# Patient Record
Sex: MALE | Race: WHITE | NOT HISPANIC OR LATINO
[De-identification: names, ages, dates, MRNs, and addresses within clinical notes are randomized per-mention and may not be internally consistent; named-entity substitution may affect disease eponyms.]

---

## 2023-02-09 ENCOUNTER — APPOINTMENT (OUTPATIENT)
Dept: FAMILY MEDICINE | Facility: CLINIC | Age: 38
End: 2023-02-09
Payer: OTHER GOVERNMENT

## 2023-02-09 VITALS
SYSTOLIC BLOOD PRESSURE: 124 MMHG | OXYGEN SATURATION: 97 % | DIASTOLIC BLOOD PRESSURE: 80 MMHG | RESPIRATION RATE: 16 BRPM | HEART RATE: 92 BPM | WEIGHT: 241 LBS | BODY MASS INDEX: 29.97 KG/M2 | HEIGHT: 75 IN | TEMPERATURE: 97.2 F

## 2023-02-09 DIAGNOSIS — Z13.29 ENCOUNTER FOR SCREENING FOR OTHER SUSPECTED ENDOCRINE DISORDER: ICD-10-CM

## 2023-02-09 DIAGNOSIS — Z13.228 ENCOUNTER FOR SCREENING FOR OTHER SUSPECTED ENDOCRINE DISORDER: ICD-10-CM

## 2023-02-09 DIAGNOSIS — Z13.0 ENCOUNTER FOR SCREENING FOR OTHER SUSPECTED ENDOCRINE DISORDER: ICD-10-CM

## 2023-02-09 DIAGNOSIS — E78.5 HYPERLIPIDEMIA, UNSPECIFIED: ICD-10-CM

## 2023-02-09 DIAGNOSIS — Z00.00 ENCOUNTER FOR GENERAL ADULT MEDICAL EXAMINATION W/OUT ABNORMAL FINDINGS: ICD-10-CM

## 2023-02-09 PROCEDURE — 99385 PREV VISIT NEW AGE 18-39: CPT

## 2023-02-09 NOTE — HISTORY OF PRESENT ILLNESS
[de-identified] : PT presenting for CPE\par came off [employer] duty in Sept 2022\par Is currently doing well\par \par Crestor- 20 mg doing well\par has been on it for about a year\par \par No surgeries \par Would like referral for vasectomy \par hx of hydroceles, mainly on the left. gets US done every 5 years to check for any concerns \par hx Of HSV -2\par \par Mother had hx of of abnormal colonoscopy\par last colonoscopy was 2022

## 2023-02-09 NOTE — HEALTH RISK ASSESSMENT
[Good] : ~his/her~  mood as  good [Yes] : Yes [Monthly or less (1 pt)] : Monthly or less (1 point) [No] : In the past 12 months have you used drugs other than those required for medical reasons? No [No falls in past year] : Patient reported no falls in the past year [0] : 2) Feeling down, depressed, or hopeless: Not at all (0) [PHQ-2 Negative - No further assessment needed] : PHQ-2 Negative - No further assessment needed [Patient reported colonoscopy was normal] : Patient reported colonoscopy was normal [Fully functional (bathing, dressing, toileting, transferring, walking, feeding)] : Fully functional (bathing, dressing, toileting, transferring, walking, feeding) [Fully functional (using the telephone, shopping, preparing meals, housekeeping, doing laundry, using] : Fully functional and needs no help or supervision to perform IADLs (using the telephone, shopping, preparing meals, housekeeping, doing laundry, using transportation, managing medications and managing finances) [Never] : Never [Change in mental status noted] : No change in mental status noted [Language] : denies difficulty with language [Handling Complex Tasks] : denies difficulty handling complex tasks [ColonoscopyDate] : 08/22 [ColonoscopyComments] : return in 5 years

## 2023-02-09 NOTE — ASSESSMENT
[FreeTextEntry1] : Pt presenting for annual physical. \par \par Reviewed diet, exercise (150 min/moderate intensity exercise weekly), lifestyle modifications. \par Discussed STD prevention and discussed screening studies per below:\par \par Colon Cancer-UTD\par Labs ordered per above. \par f.u in 1 year or earlier if needed \par \par \par

## 2023-02-10 LAB
25(OH)D3 SERPL-MCNC: 16.7 NG/ML
ALBUMIN SERPL ELPH-MCNC: 4.8 G/DL
ALP BLD-CCNC: 96 U/L
ALT SERPL-CCNC: 47 U/L
ANION GAP SERPL CALC-SCNC: 17 MMOL/L
AST SERPL-CCNC: 30 U/L
BASOPHILS # BLD AUTO: 0.07 K/UL
BASOPHILS NFR BLD AUTO: 0.9 %
BILIRUB SERPL-MCNC: 0.4 MG/DL
BUN SERPL-MCNC: 14 MG/DL
CALCIUM SERPL-MCNC: 9.6 MG/DL
CHLORIDE SERPL-SCNC: 103 MMOL/L
CHOLEST SERPL-MCNC: 152 MG/DL
CO2 SERPL-SCNC: 22 MMOL/L
CREAT SERPL-MCNC: 0.93 MG/DL
EGFR: 107 ML/MIN/1.73M2
EOSINOPHIL # BLD AUTO: 0.17 K/UL
EOSINOPHIL NFR BLD AUTO: 2.2 %
ESTIMATED AVERAGE GLUCOSE: 114 MG/DL
GLUCOSE SERPL-MCNC: 92 MG/DL
HBA1C MFR BLD HPLC: 5.6 %
HCT VFR BLD CALC: 47.1 %
HDLC SERPL-MCNC: 46 MG/DL
HGB BLD-MCNC: 16.4 G/DL
IMM GRANULOCYTES NFR BLD AUTO: 0.3 %
LDLC SERPL CALC-MCNC: 53 MG/DL
LYMPHOCYTES # BLD AUTO: 1.84 K/UL
LYMPHOCYTES NFR BLD AUTO: 24.1 %
MAN DIFF?: NORMAL
MCHC RBC-ENTMCNC: 30.6 PG
MCHC RBC-ENTMCNC: 34.8 GM/DL
MCV RBC AUTO: 87.9 FL
MONOCYTES # BLD AUTO: 0.69 K/UL
MONOCYTES NFR BLD AUTO: 9.1 %
NEUTROPHILS # BLD AUTO: 4.83 K/UL
NEUTROPHILS NFR BLD AUTO: 63.4 %
NONHDLC SERPL-MCNC: 105 MG/DL
PLATELET # BLD AUTO: 189 K/UL
POTASSIUM SERPL-SCNC: 4.2 MMOL/L
PROT SERPL-MCNC: 7.5 G/DL
PSA SERPL-MCNC: 0.91 NG/ML
RBC # BLD: 5.36 M/UL
RBC # FLD: 13 %
SODIUM SERPL-SCNC: 142 MMOL/L
TRIGL SERPL-MCNC: 263 MG/DL
TSH SERPL-ACNC: 1.28 UIU/ML
WBC # FLD AUTO: 7.62 K/UL

## 2023-03-27 ENCOUNTER — APPOINTMENT (OUTPATIENT)
Dept: UROLOGY | Facility: CLINIC | Age: 38
End: 2023-03-27
Payer: OTHER GOVERNMENT

## 2023-03-27 VITALS
HEIGHT: 75 IN | WEIGHT: 230 LBS | RESPIRATION RATE: 16 BRPM | DIASTOLIC BLOOD PRESSURE: 84 MMHG | SYSTOLIC BLOOD PRESSURE: 124 MMHG | BODY MASS INDEX: 28.6 KG/M2 | HEART RATE: 71 BPM

## 2023-03-27 DIAGNOSIS — Z80.8 FAMILY HISTORY OF MALIGNANT NEOPLASM OF OTHER ORGANS OR SYSTEMS: ICD-10-CM

## 2023-03-27 DIAGNOSIS — Z87.891 PERSONAL HISTORY OF NICOTINE DEPENDENCE: ICD-10-CM

## 2023-03-27 DIAGNOSIS — Z87.19 PERSONAL HISTORY OF OTHER DISEASES OF THE DIGESTIVE SYSTEM: ICD-10-CM

## 2023-03-27 DIAGNOSIS — Z87.438 PERSONAL HISTORY OF OTHER DISEASES OF MALE GENITAL ORGANS: ICD-10-CM

## 2023-03-27 DIAGNOSIS — Z86.19 PERSONAL HISTORY OF OTHER INFECTIOUS AND PARASITIC DISEASES: ICD-10-CM

## 2023-03-27 PROCEDURE — 99203 OFFICE O/P NEW LOW 30 MIN: CPT

## 2023-03-27 NOTE — ASSESSMENT
[FreeTextEntry1] : 37 year old man requesting vasectomy. Discussed the risks, benefits, and alternatives of bilateral vasectomy. Risks include bleeding, infection, scrotal swelling, hematoma, incomplete vasectomy, spontaneous reanastomosis of vasectomy, incisional pain, testis pain, and abd pain. Discussed with patient that he will not be considered sterile, until azoospermia is demonstrated on semen analysis. Pt signed consent form and procedure will be performed after mandated 30 day waiting period. All questions and concerns addressed. \par - RTC 30 days for vasectomy\par

## 2023-03-27 NOTE — PHYSICAL EXAM
[General Appearance - Well Developed] : well developed [General Appearance - Well Nourished] : well nourished [Normal Appearance] : normal appearance [Well Groomed] : well groomed [General Appearance - In No Acute Distress] : no acute distress [Edema] : no peripheral edema [] : no respiratory distress [Respiration, Rhythm And Depth] : normal respiratory rhythm and effort [Exaggerated Use Of Accessory Muscles For Inspiration] : no accessory muscle use [Urethral Meatus] : meatus normal [Penis Abnormality] : normal circumcised penis [Normal Station and Gait] : the gait and station were normal for the patient's age [No Focal Deficits] : no focal deficits [Oriented To Time, Place, And Person] : oriented to person, place, and time [Affect] : the affect was normal [Mood] : the mood was normal [Not Anxious] : not anxious

## 2023-03-27 NOTE — HISTORY OF PRESENT ILLNESS
[FreeTextEntry1] : 36yo M presents to discuss vasectomy\par He has known varicocele that causes intermittent mild to moderate pain 5/10. Pt doesn't want any procedure for it\par He has 2 children. Doesn't want any more\par Understands vasectomy is seen as permanent procedure

## 2023-05-11 ENCOUNTER — APPOINTMENT (OUTPATIENT)
Dept: UROLOGY | Facility: CLINIC | Age: 38
End: 2023-05-11

## 2023-05-17 ENCOUNTER — APPOINTMENT (OUTPATIENT)
Dept: UROLOGY | Facility: CLINIC | Age: 38
End: 2023-05-17
Payer: OTHER GOVERNMENT

## 2023-05-17 ENCOUNTER — TRANSCRIPTION ENCOUNTER (OUTPATIENT)
Age: 38
End: 2023-05-17

## 2023-05-17 VITALS
SYSTOLIC BLOOD PRESSURE: 131 MMHG | HEART RATE: 73 BPM | BODY MASS INDEX: 30.09 KG/M2 | HEIGHT: 75 IN | DIASTOLIC BLOOD PRESSURE: 89 MMHG | WEIGHT: 242 LBS | TEMPERATURE: 98.2 F

## 2023-05-17 PROCEDURE — 55250 REMOVAL OF SPERM DUCT(S): CPT

## 2023-05-18 ENCOUNTER — NON-APPOINTMENT (OUTPATIENT)
Age: 38
End: 2023-05-18

## 2023-05-25 LAB — CORE LAB BIOPSY: NORMAL

## 2023-05-30 ENCOUNTER — APPOINTMENT (OUTPATIENT)
Dept: UROLOGY | Facility: CLINIC | Age: 38
End: 2023-05-30
Payer: OTHER GOVERNMENT

## 2023-05-30 VITALS
SYSTOLIC BLOOD PRESSURE: 120 MMHG | WEIGHT: 242 LBS | DIASTOLIC BLOOD PRESSURE: 79 MMHG | BODY MASS INDEX: 30.09 KG/M2 | HEIGHT: 75 IN | HEART RATE: 79 BPM | TEMPERATURE: 97.1 F

## 2023-05-30 DIAGNOSIS — Z30.09 ENCOUNTER FOR OTHER GENERAL COUNSELING AND ADVICE ON CONTRACEPTION: ICD-10-CM

## 2023-05-30 PROCEDURE — 99024 POSTOP FOLLOW-UP VISIT: CPT

## 2023-05-30 NOTE — PHYSICAL EXAM
[General Appearance - Well Developed] : well developed [General Appearance - Well Nourished] : well nourished [Normal Appearance] : normal appearance [Well Groomed] : well groomed [General Appearance - In No Acute Distress] : no acute distress [FreeTextEntry1] : both incisions healing well. both testicle are sensitive to palpation [Edema] : no peripheral edema [] : no respiratory distress [Respiration, Rhythm And Depth] : normal respiratory rhythm and effort [Exaggerated Use Of Accessory Muscles For Inspiration] : no accessory muscle use [Oriented To Time, Place, And Person] : oriented to person, place, and time [Affect] : the affect was normal [Mood] : the mood was normal [Not Anxious] : not anxious [Normal Station and Gait] : the gait and station were normal for the patient's age

## 2023-05-30 NOTE — ASSESSMENT
[FreeTextEntry1] : post vasectomy Pain:\par monitor\par c/w advil prn\par if pain persists, will discuss gabapentin vs tricyclics

## 2023-05-30 NOTE — HISTORY OF PRESENT ILLNESS
[FreeTextEntry1] : 38yo M presents after vasectomy 2 weeks ago\par He reports persistent pain on both testicle. Denies any fever, chills, dysuria, abdominal or flank pain\par States pain is different than his varicocele pain which usually appears on left side

## 2023-06-20 ENCOUNTER — APPOINTMENT (OUTPATIENT)
Dept: UROLOGY | Facility: CLINIC | Age: 38
End: 2023-06-20

## 2023-06-27 ENCOUNTER — APPOINTMENT (OUTPATIENT)
Dept: INTERNAL MEDICINE | Facility: CLINIC | Age: 38
End: 2023-06-27
Payer: OTHER GOVERNMENT

## 2023-06-27 VITALS
TEMPERATURE: 98.2 F | OXYGEN SATURATION: 99 % | HEART RATE: 83 BPM | SYSTOLIC BLOOD PRESSURE: 110 MMHG | DIASTOLIC BLOOD PRESSURE: 70 MMHG | WEIGHT: 241 LBS | HEIGHT: 75 IN | BODY MASS INDEX: 29.97 KG/M2

## 2023-06-27 DIAGNOSIS — J02.9 ACUTE PHARYNGITIS, UNSPECIFIED: ICD-10-CM

## 2023-06-27 LAB
FLUAV SPEC QL CULT: NEGATIVE
FLUBV AG SPEC QL IA: NEGATIVE
SARS-COV-2 AG RESP QL IA.RAPID: NEGATIVE

## 2023-06-27 PROCEDURE — 87804 INFLUENZA ASSAY W/OPTIC: CPT | Mod: QW

## 2023-06-27 PROCEDURE — 99212 OFFICE O/P EST SF 10 MIN: CPT | Mod: 25

## 2023-06-27 PROCEDURE — 87811 SARS-COV-2 COVID19 W/OPTIC: CPT

## 2023-06-27 RX ORDER — BENZONATATE 200 MG/1
200 CAPSULE ORAL
Qty: 15 | Refills: 0 | Status: ACTIVE | COMMUNITY
Start: 2023-06-27 | End: 1900-01-01

## 2023-06-27 NOTE — HISTORY OF PRESENT ILLNESS
[FreeTextEntry8] : 37M PMH SALMA presents for sore throat and hoarseness. He stated that his symptoms started yesterday and he is losing his voice. He reports URIs prior once a year treated with azithromycin and Tessalon Pearls with no reported complications. He denies any fevers, chills, cough, chest pain, shortness of breath. He has no history of asthma and COPD. He is not a smoker. He has seasonal allergies.

## 2023-06-27 NOTE — PLAN
[FreeTextEntry1] : 37M PMH HLD presents for sore throat and hoarseness. \par \par # acute pharyngitis\par - sore throat and hoarseness started yesterday. No fevers, chest pain, shortness of breath\par - afebrile, vitals stable\par - on exam with clear lungs, no pharyngeal exudates, no LN\par - covid and flu POCT negative\par - explained this is likely viral and encouraged supportive care with steam inhalation and warm liquids\par - rx for Tessalon pearls sent\par - counseled that if symptoms get worse by next week to follow up.

## 2023-06-27 NOTE — HEALTH RISK ASSESSMENT
[Monthly or less (1 pt)] : Monthly or less (1 point) [1 or 2 (0 pts)] : 1 or 2 (0 points) [Never (0 pts)] : Never (0 points) [No] : In the past 12 months have you used drugs other than those required for medical reasons? No [0] : 2) Feeling down, depressed, or hopeless: Not at all (0) [PHQ-2 Negative - No further assessment needed] : PHQ-2 Negative - No further assessment needed [Audit-CScore] : 0 [[id number]] : 0 [Never] : Never

## 2023-08-16 ENCOUNTER — APPOINTMENT (OUTPATIENT)
Dept: UROLOGY | Facility: CLINIC | Age: 38
End: 2023-08-16
Payer: OTHER GOVERNMENT

## 2023-08-16 VITALS
TEMPERATURE: 98.2 F | HEART RATE: 65 BPM | WEIGHT: 241 LBS | SYSTOLIC BLOOD PRESSURE: 123 MMHG | HEIGHT: 75 IN | DIASTOLIC BLOOD PRESSURE: 79 MMHG | BODY MASS INDEX: 29.97 KG/M2

## 2023-08-16 DIAGNOSIS — N45.1 EPIDIDYMITIS: ICD-10-CM

## 2023-08-16 PROCEDURE — 99213 OFFICE O/P EST LOW 20 MIN: CPT

## 2023-08-16 RX ORDER — DOXYCYCLINE HYCLATE 100 MG/1
100 CAPSULE ORAL
Qty: 20 | Refills: 0 | Status: ACTIVE | COMMUNITY
Start: 2023-08-16 | End: 1900-01-01

## 2023-08-16 NOTE — ASSESSMENT
[FreeTextEntry1] : Left epididymitis: doxycycline bid x 10 days avoid prolong sunlight exposure  s/p vasectomy: will get semen analysis and call for result

## 2023-08-16 NOTE — PHYSICAL EXAM
[General Appearance - Well Developed] : well developed [General Appearance - Well Nourished] : well nourished [Normal Appearance] : normal appearance [Well Groomed] : well groomed [General Appearance - In No Acute Distress] : no acute distress [Abdomen Hernia] : no hernia was discovered [Urethral Meatus] : meatus normal [Penis Abnormality] : normal circumcised penis [FreeTextEntry1] : left epididymis and scrotal cord tender to palpation [Oriented To Time, Place, And Person] : oriented to person, place, and time [Affect] : the affect was normal [Mood] : the mood was normal [Not Anxious] : not anxious [Normal Station and Gait] : the gait and station were normal for the patient's age [No Focal Deficits] : no focal deficits

## 2023-08-17 ENCOUNTER — APPOINTMENT (OUTPATIENT)
Dept: FAMILY MEDICINE | Facility: CLINIC | Age: 38
End: 2023-08-17
Payer: OTHER GOVERNMENT

## 2023-08-17 ENCOUNTER — APPOINTMENT (OUTPATIENT)
Dept: RADIOLOGY | Facility: CLINIC | Age: 38
End: 2023-08-17

## 2023-08-17 ENCOUNTER — RESULT REVIEW (OUTPATIENT)
Age: 38
End: 2023-08-17

## 2023-08-17 ENCOUNTER — OUTPATIENT (OUTPATIENT)
Dept: OUTPATIENT SERVICES | Facility: HOSPITAL | Age: 38
LOS: 1 days | End: 2023-08-17
Payer: OTHER GOVERNMENT

## 2023-08-17 VITALS
BODY MASS INDEX: 29.97 KG/M2 | OXYGEN SATURATION: 98 % | DIASTOLIC BLOOD PRESSURE: 84 MMHG | TEMPERATURE: 97.4 F | HEIGHT: 75 IN | HEART RATE: 82 BPM | SYSTOLIC BLOOD PRESSURE: 128 MMHG | WEIGHT: 241 LBS

## 2023-08-17 DIAGNOSIS — M25.559 PAIN IN UNSPECIFIED HIP: ICD-10-CM

## 2023-08-17 DIAGNOSIS — M54.2 CERVICALGIA: ICD-10-CM

## 2023-08-17 PROCEDURE — 72100 X-RAY EXAM L-S SPINE 2/3 VWS: CPT

## 2023-08-17 PROCEDURE — 73502 X-RAY EXAM HIP UNI 2-3 VIEWS: CPT | Mod: 26,LT

## 2023-08-17 PROCEDURE — 73502 X-RAY EXAM HIP UNI 2-3 VIEWS: CPT

## 2023-08-17 PROCEDURE — 99214 OFFICE O/P EST MOD 30 MIN: CPT

## 2023-08-17 PROCEDURE — 72100 X-RAY EXAM L-S SPINE 2/3 VWS: CPT | Mod: 26

## 2023-08-17 NOTE — PHYSICAL EXAM
[Normal] : no acute distress, well nourished, well developed and well-appearing [de-identified] : hip and back pain

## 2023-08-17 NOTE — ASSESSMENT
[FreeTextEntry1] : most likely [facility] related  advised to check xray  cont PT and stretching f/u with ortho if needed

## 2023-08-17 NOTE — HISTORY OF PRESENT ILLNESS
[FreeTextEntry8] : PT presenting for severe back and hip pain was walking his dogs and they pulled forward and he injured his back and hip has been going to chiropractor without help

## 2023-08-18 ENCOUNTER — NON-APPOINTMENT (OUTPATIENT)
Age: 38
End: 2023-08-18

## 2023-08-21 LAB — BACTERIA UR CULT: NORMAL

## 2023-09-05 ENCOUNTER — APPOINTMENT (OUTPATIENT)
Dept: FAMILY MEDICINE | Facility: CLINIC | Age: 38
End: 2023-09-05
Payer: OTHER GOVERNMENT

## 2023-09-05 VITALS
RESPIRATION RATE: 16 BRPM | DIASTOLIC BLOOD PRESSURE: 80 MMHG | HEIGHT: 75 IN | HEART RATE: 82 BPM | OXYGEN SATURATION: 99 % | BODY MASS INDEX: 29.97 KG/M2 | SYSTOLIC BLOOD PRESSURE: 110 MMHG | WEIGHT: 241 LBS

## 2023-09-05 PROCEDURE — 99214 OFFICE O/P EST MOD 30 MIN: CPT

## 2023-09-05 RX ORDER — MELOXICAM 7.5 MG/1
7.5 TABLET ORAL
Qty: 60 | Refills: 0 | Status: ACTIVE | COMMUNITY
Start: 2023-09-05 | End: 1900-01-01

## 2023-09-05 NOTE — PHYSICAL EXAM
[Normal] : no acute distress, well nourished, well developed and well-appearing [de-identified] : pain at sacrum, full ROM with pain

## 2023-09-05 NOTE — HISTORY OF PRESENT ILLNESS
[de-identified] : PT presenting for f/u of low back pain.  Has been using heating pad, steroids, exercising and stretching helps with mobility but pain is worse sees chiropractor regularly  Pain is situated to SI joint area mainly on left  declined continuous numbness or tingling

## 2023-09-05 NOTE — REVIEW OF SYSTEMS
[Joint Pain] : joint pain [Back Pain] : back pain [Joint Swelling] : joint swelling [Negative] : Constitutional

## 2023-09-08 ENCOUNTER — APPOINTMENT (OUTPATIENT)
Dept: PHYSICAL MEDICINE AND REHAB | Facility: CLINIC | Age: 38
End: 2023-09-08
Payer: OTHER GOVERNMENT

## 2023-09-08 ENCOUNTER — TRANSCRIPTION ENCOUNTER (OUTPATIENT)
Age: 38
End: 2023-09-08

## 2023-09-08 VITALS
BODY MASS INDEX: 29.84 KG/M2 | WEIGHT: 240 LBS | RESPIRATION RATE: 14 BRPM | SYSTOLIC BLOOD PRESSURE: 122 MMHG | HEART RATE: 85 BPM | HEIGHT: 75 IN | DIASTOLIC BLOOD PRESSURE: 80 MMHG

## 2023-09-08 PROCEDURE — 99204 OFFICE O/P NEW MOD 45 MIN: CPT

## 2023-09-08 NOTE — DATA REVIEWED
[Plain X-Rays] : plain X-Rays [FreeTextEntry1] : X-rays L-spine (Aug 2023): Broad slight levocurvature. No compression fractures.  Narrowed L1-L2 disc space with small anterior disc margin osteophytes and slight L1 on L2 retrolisthesis without spondylolysis defects. Remaining vertebral body alignment and disc space heights maintained.  Unremarkable SI joints and partially visualized hips.  No lytic or blastic lesions.  X-rays Hips (Aug 2023): No fractures or dislocations.  Preserved left hip joint space and no gross radiographic evidence for AVN. Abnormal femoral head neck offset.   No discrete lytic or blastic lesions. Unremarkable remaining visualized osseous structures.

## 2023-09-08 NOTE — HISTORY OF PRESENT ILLNESS
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  Pt. states that he was walking his dogs when they pulled on leash jerking him forward.  Pt. relates h/o intermittent left sided back and hip pain over the last 10 years.  Also endorses h/o chronic, intermittent neck pain.  Pain can now radiate into his buttock but denies distal radiation of pain down legs.  No N/T/W/B in leg or foot.  X-rays L-spine done and reviewed below.  Pt. has been treated in chiro on/off.  No P.T.  Takes meloxicam and flexeril.  No hip or spine injections.

## 2023-09-08 NOTE — ASSESSMENT
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  I spent most of today's office visit (40 minutes) reviewing the patient's x-rays, discussing etiology, pathogenesis, further diagnostic work-up and nonoperative management.  X-rays of the patient's lumbosacral spine are consistent with a slight retrolisthesis L1 over L2 without associated spondylolysis defects.  X-rays of the patient's left hip are significant for abnormal femoral head neck offset with preserved femoral acetabular joint space.  Although the patient does not have traditional risk factors for sacroiliac joint pain, his physical examination is suggestive of it.  Prior to performing an image guided local anesthetic block, I would like the patient to complete a full course of physical therapy to focus on pain relieving modalities, gentle range of motion, stretching and strengthening exercises.  I advised caution with the chronic use of p.o. NSAIDs secondary to their GI, cardiac and renal toxicities.  I advised him to reserve the skeletal muscle relaxant for nighttime use only secondary to the risk of CNS depression.  The patient is in agreement with the plan.  All questions have been answered.  Return to office 6-8 weeks.

## 2023-09-08 NOTE — PHYSICAL EXAM
[FreeTextEntry1] : NAD A&Ox3 Mild obesity ROM L-spine: 1/2 full forward flexion; 20-25' extension and left LR -> pain (discordant) ROM Hips: smooth IR/ER w/o pain Pelvic tilt: slight left Seated slump test: neg left SLR: + 30' (left buttock only) DTR's: 2+ knees/ankles MMT: 5/5 b/l LE Sensation: SILT Toe & Heel Walk: Yes Palpation: left SI joint TTP (most concordant)

## 2023-09-19 ENCOUNTER — TRANSCRIPTION ENCOUNTER (OUTPATIENT)
Age: 38
End: 2023-09-19

## 2023-09-22 ENCOUNTER — TRANSCRIPTION ENCOUNTER (OUTPATIENT)
Age: 38
End: 2023-09-22

## 2023-09-26 ENCOUNTER — APPOINTMENT (OUTPATIENT)
Dept: UROLOGY | Facility: CLINIC | Age: 38
End: 2023-09-26
Payer: OTHER GOVERNMENT

## 2023-09-26 VITALS
DIASTOLIC BLOOD PRESSURE: 72 MMHG | WEIGHT: 240 LBS | SYSTOLIC BLOOD PRESSURE: 115 MMHG | HEIGHT: 75 IN | BODY MASS INDEX: 29.84 KG/M2 | HEART RATE: 76 BPM | OXYGEN SATURATION: 99 % | TEMPERATURE: 97 F

## 2023-09-26 DIAGNOSIS — N50.812 LEFT TESTICULAR PAIN: ICD-10-CM

## 2023-09-26 PROCEDURE — 99213 OFFICE O/P EST LOW 20 MIN: CPT

## 2023-09-26 RX ORDER — METHYLPREDNISOLONE 4 MG/1
4 TABLET ORAL
Qty: 1 | Refills: 0 | Status: DISCONTINUED | COMMUNITY
Start: 2023-08-17 | End: 2023-09-26

## 2023-09-28 ENCOUNTER — TRANSCRIPTION ENCOUNTER (OUTPATIENT)
Age: 38
End: 2023-09-28

## 2023-10-02 ENCOUNTER — TRANSCRIPTION ENCOUNTER (OUTPATIENT)
Age: 38
End: 2023-10-02

## 2023-10-02 RX ORDER — CYCLOBENZAPRINE HYDROCHLORIDE 10 MG/1
10 TABLET, FILM COATED ORAL
Qty: 30 | Refills: 0 | Status: ACTIVE | COMMUNITY
Start: 2023-09-05 | End: 1900-01-01

## 2023-10-03 ENCOUNTER — TRANSCRIPTION ENCOUNTER (OUTPATIENT)
Age: 38
End: 2023-10-03

## 2023-10-03 RX ORDER — PREDNISONE 10 MG/1
10 TABLET ORAL
Qty: 20 | Refills: 0 | Status: ACTIVE | COMMUNITY
Start: 2023-09-05 | End: 1900-01-01

## 2023-10-16 ENCOUNTER — TRANSCRIPTION ENCOUNTER (OUTPATIENT)
Age: 38
End: 2023-10-16

## 2023-11-15 ENCOUNTER — TRANSCRIPTION ENCOUNTER (OUTPATIENT)
Age: 38
End: 2023-11-15

## 2023-11-16 ENCOUNTER — TRANSCRIPTION ENCOUNTER (OUTPATIENT)
Age: 38
End: 2023-11-16

## 2023-11-17 ENCOUNTER — TRANSCRIPTION ENCOUNTER (OUTPATIENT)
Age: 38
End: 2023-11-17

## 2023-11-20 ENCOUNTER — TRANSCRIPTION ENCOUNTER (OUTPATIENT)
Age: 38
End: 2023-11-20

## 2023-11-29 ENCOUNTER — TRANSCRIPTION ENCOUNTER (OUTPATIENT)
Age: 38
End: 2023-11-29

## 2023-12-04 ENCOUNTER — TRANSCRIPTION ENCOUNTER (OUTPATIENT)
Age: 38
End: 2023-12-04

## 2023-12-05 RX ORDER — ROSUVASTATIN CALCIUM 20 MG/1
20 TABLET, FILM COATED ORAL DAILY
Qty: 90 | Refills: 1 | Status: ACTIVE | COMMUNITY

## 2024-01-12 ENCOUNTER — APPOINTMENT (OUTPATIENT)
Dept: PHYSICAL MEDICINE AND REHAB | Facility: CLINIC | Age: 39
End: 2024-01-12
Payer: OTHER GOVERNMENT

## 2024-01-12 VITALS
WEIGHT: 240 LBS | HEART RATE: 76 BPM | DIASTOLIC BLOOD PRESSURE: 81 MMHG | BODY MASS INDEX: 29.84 KG/M2 | HEIGHT: 75 IN | RESPIRATION RATE: 14 BRPM | SYSTOLIC BLOOD PRESSURE: 129 MMHG

## 2024-01-12 PROCEDURE — 99214 OFFICE O/P EST MOD 30 MIN: CPT

## 2024-01-12 NOTE — ASSESSMENT
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  I spent most of today's office visit (30 minutes) re-reviewing the patient's x-rays, discussing etiology, pathogenesis, further diagnostic work-up and nonoperative management.  X-rays of the patient's lumbosacral spine significant for slight retrolisthesis L1 over L2 without associated spondylolysis defects.  His sacroiliac joints were unremarkable.  X-rays of the patient's left hip significant for abnormal femoral head neck offset with preserved femoral acetabular joint space.  Although the patient's lumbar spine range of motion has improved in physical therapy, he is still symptomatic in the upper left gluteal region.  Differential diagnosis includes left-sided sacroiliac joint dysfunction stemming from underlying hamstring tightness with failure of the gluteus cyndi to force close the sacroiliac joint.  At this time, I do not think that the patient's pain is referable from his left hip joint as his provocative hip maneuvers are all negative.  I advised caution with the chronic use of p.o. NSAIDs secondary to their GI, cardiac and renal toxicities. The patient is in agreement with the plan. All questions have been answered. Return to office for ultrasound evaluation left buttock and possible local anesthetic block.

## 2024-01-12 NOTE — PHYSICAL EXAM
[FreeTextEntry1] : NAD A&Ox3 Mild obesity ROM L-spine: 1/2 full forward flexion w/o bending knees; 20-25' extension and left LR -> w/o pain (improved) ROM Hips: smooth IR/ER w/o pain (static - no change) Pelvic tilt: slight left Seated slump test: neg left (tight hamstrings) SLR: + 70' (improved) DTR's: 2+ knees/ankles MMT: 5/5 b/l LE Sensation: SILT Toe & Heel Walk: Yes Palpation: left SI joint mild TTP; superior buttock TTP - most concordant)

## 2024-01-12 NOTE — HISTORY OF PRESENT ILLNESS
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  Pt. has completed about 12-13 visits of P.T. w/ some improvement noted in mobility and strength.  Still has left sided LS back pain w/o pain in groin.  No recent tingling in plantar aspect left foot.

## 2024-01-23 ENCOUNTER — APPOINTMENT (OUTPATIENT)
Dept: DERMATOLOGY | Facility: CLINIC | Age: 39
End: 2024-01-23

## 2024-01-23 ENCOUNTER — TRANSCRIPTION ENCOUNTER (OUTPATIENT)
Age: 39
End: 2024-01-23

## 2024-01-29 ENCOUNTER — NON-APPOINTMENT (OUTPATIENT)
Age: 39
End: 2024-01-29

## 2024-01-31 ENCOUNTER — APPOINTMENT (OUTPATIENT)
Dept: PHYSICAL MEDICINE AND REHAB | Facility: CLINIC | Age: 39
End: 2024-01-31
Payer: OTHER GOVERNMENT

## 2024-01-31 VITALS
SYSTOLIC BLOOD PRESSURE: 121 MMHG | WEIGHT: 240 LBS | HEART RATE: 71 BPM | DIASTOLIC BLOOD PRESSURE: 80 MMHG | BODY MASS INDEX: 30 KG/M2

## 2024-01-31 DIAGNOSIS — M25.559 PAIN IN UNSPECIFIED HIP: ICD-10-CM

## 2024-01-31 DIAGNOSIS — M53.3 SACROCOCCYGEAL DISORDERS, NOT ELSEWHERE CLASSIFIED: ICD-10-CM

## 2024-01-31 PROCEDURE — 76882 US LMTD JT/FCL EVL NVASC XTR: CPT | Mod: LT

## 2024-01-31 PROCEDURE — 99214 OFFICE O/P EST MOD 30 MIN: CPT

## 2024-01-31 NOTE — ASSESSMENT
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  I spent most of today's office visit (30 minutes) reviewing and performing the patient's US examination right posterior hip/buttock, discussing etiology, pathogenesis, further diagnostic work-up and nonoperative management.  Today's ultrasound examination right posterior hip/buttock was unremarkable.  The patient did not sono palpate to concordant pain over any of the musculotendinous structures visualized.  We can now say that the patient's pain does not emanate from the short hip external rotators.  X-rays of the patient's lumbosacral spine previously reviewed significant for slight retrolisthesis L1 over L2 without associated spondylolysis defects.  His sacroiliac joints were unremarkable.  X-rays of the patient's left hip significant for abnormal femoral head neck offset with preserved femoral acetabular joint space.  Differential diagnosis of the patient's upper left buttock discomfort still includes left-sided sacroiliac joint dysfunction stemming from underlying hamstring tightness with failure of the gluteus cyndi to force close the sacroiliac joint.  At this time, I do not think that the patient's pain is referable from his left hip joint as his provocative hip maneuvers have all negative.  We are awaiting insurance authorization for a fluoroscopically guided sacroiliac joint injection.  I again advised caution with the chronic use of p.o. NSAIDs secondary to their GI, cardiac and renal toxicities. The patient is in agreement with the plan. All questions have been answered.  Next appointment will hopefully be for sacroiliac joint injection.

## 2024-01-31 NOTE — PROCEDURE
[de-identified] : [facility] US EXAMINATION LEFT POSTERIOR HIP/BUTTOCK  The patient was placed in a prone position.  A curvilinear, low-frequency ultrasound probe was first placed along the proximal iliac crest to visualize the attachments of the gluteus cyndi, medius and minimus muscles.  The superior gluteal nerve was visualized between the gluteus medius and minimus muscles.  The patient did not sono palpate to concordant pain over the superior gluteal nerve.  The ultrasound probe was then translated inferiorly to visualize the boat shaped piriformis muscle coursing over the ileum.  The sciatic nerve was visualized passing over the piriformis muscle.  The patient did not sono palpate to concordant pain upon compression of the sciatic nerve.  The ultrasound probe was translated further distally to visualize the obturator internus muscle with the sciatic nerve sitting superiorly.  Passive internal and external rotation of the patient's left hip revealed the obturator internus tendon sliding over the ischial spine.  There was no demonstrable muscle or tendon abnormality and again the patient did not sono palpate to concordant pain.  Just slightly distal to the obturator internus, the gemellus muscle complex was seen as the "snake eating the egg".  To finish the examination, the probe was then placed between the posterior trochanteric ridge of the femur and the ischial tuberosity to visualize the ischiofemoral interval with the quadratus femoris muscle.  There was no demonstrable muscle abnormality and the patient did not sono palpate to concordant pain.

## 2024-01-31 NOTE — HISTORY OF PRESENT ILLNESS
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  Results detailed below.

## 2024-02-01 ENCOUNTER — TRANSCRIPTION ENCOUNTER (OUTPATIENT)
Age: 39
End: 2024-02-01

## 2024-02-20 ENCOUNTER — TRANSCRIPTION ENCOUNTER (OUTPATIENT)
Age: 39
End: 2024-02-20

## 2024-02-21 ENCOUNTER — TRANSCRIPTION ENCOUNTER (OUTPATIENT)
Age: 39
End: 2024-02-21

## 2024-02-22 ENCOUNTER — TRANSCRIPTION ENCOUNTER (OUTPATIENT)
Age: 39
End: 2024-02-22

## 2024-02-26 ENCOUNTER — APPOINTMENT (OUTPATIENT)
Dept: PHYSICAL MEDICINE AND REHAB | Facility: CLINIC | Age: 39
End: 2024-02-26

## 2024-03-18 ENCOUNTER — TRANSCRIPTION ENCOUNTER (OUTPATIENT)
Age: 39
End: 2024-03-18

## 2024-03-18 ENCOUNTER — NON-APPOINTMENT (OUTPATIENT)
Age: 39
End: 2024-03-18

## 2024-10-04 ENCOUNTER — TRANSCRIPTION ENCOUNTER (OUTPATIENT)
Age: 39
End: 2024-10-04

## 2024-10-16 ENCOUNTER — NON-APPOINTMENT (OUTPATIENT)
Age: 39
End: 2024-10-16

## 2024-11-25 ENCOUNTER — APPOINTMENT (OUTPATIENT)
Dept: FAMILY MEDICINE | Facility: CLINIC | Age: 39
End: 2024-11-25
Payer: OTHER GOVERNMENT

## 2024-11-25 VITALS
TEMPERATURE: 97.2 F | SYSTOLIC BLOOD PRESSURE: 124 MMHG | HEIGHT: 75 IN | OXYGEN SATURATION: 97 % | DIASTOLIC BLOOD PRESSURE: 70 MMHG | BODY MASS INDEX: 30.09 KG/M2 | WEIGHT: 242 LBS | HEART RATE: 81 BPM

## 2024-11-25 DIAGNOSIS — Z00.00 ENCOUNTER FOR GENERAL ADULT MEDICAL EXAMINATION W/OUT ABNORMAL FINDINGS: ICD-10-CM

## 2024-11-25 DIAGNOSIS — Z23 ENCOUNTER FOR IMMUNIZATION: ICD-10-CM

## 2024-11-25 DIAGNOSIS — Z13.228 ENCOUNTER FOR SCREENING FOR OTHER SUSPECTED ENDOCRINE DISORDER: ICD-10-CM

## 2024-11-25 DIAGNOSIS — Z13.29 ENCOUNTER FOR SCREENING FOR OTHER SUSPECTED ENDOCRINE DISORDER: ICD-10-CM

## 2024-11-25 DIAGNOSIS — M25.521 PAIN IN RIGHT ELBOW: ICD-10-CM

## 2024-11-25 DIAGNOSIS — R06.83 SNORING: ICD-10-CM

## 2024-11-25 DIAGNOSIS — E78.5 HYPERLIPIDEMIA, UNSPECIFIED: ICD-10-CM

## 2024-11-25 DIAGNOSIS — Z13.0 ENCOUNTER FOR SCREENING FOR OTHER SUSPECTED ENDOCRINE DISORDER: ICD-10-CM

## 2024-11-25 PROCEDURE — 90656 IIV3 VACC NO PRSV 0.5 ML IM: CPT

## 2024-11-25 PROCEDURE — G0444 DEPRESSION SCREEN ANNUAL: CPT | Mod: 59

## 2024-11-25 PROCEDURE — G0008: CPT

## 2024-11-25 PROCEDURE — 99396 PREV VISIT EST AGE 40-64: CPT

## 2024-11-25 PROCEDURE — 99214 OFFICE O/P EST MOD 30 MIN: CPT | Mod: 25

## 2024-11-25 RX ORDER — NAPROXEN 500 MG/1
500 TABLET ORAL
Qty: 14 | Refills: 0 | Status: ACTIVE | COMMUNITY
Start: 2024-11-25 | End: 1900-01-01

## 2024-12-02 LAB
ALBUMIN SERPL ELPH-MCNC: 4.6 G/DL
ALP BLD-CCNC: 78 U/L
ALT SERPL-CCNC: 31 U/L
ANION GAP SERPL CALC-SCNC: 13 MMOL/L
AST SERPL-CCNC: 21 U/L
BASOPHILS # BLD AUTO: 0.05 K/UL
BASOPHILS NFR BLD AUTO: 0.8 %
BILIRUB SERPL-MCNC: 0.6 MG/DL
BUN SERPL-MCNC: 13 MG/DL
CALCIUM SERPL-MCNC: 9.6 MG/DL
CHLORIDE SERPL-SCNC: 106 MMOL/L
CHOLEST SERPL-MCNC: 199 MG/DL
CO2 SERPL-SCNC: 22 MMOL/L
CREAT SERPL-MCNC: 1.01 MG/DL
EGFR: 96 ML/MIN/1.73M2
EOSINOPHIL # BLD AUTO: 0.39 K/UL
EOSINOPHIL NFR BLD AUTO: 6.6 %
ESTIMATED AVERAGE GLUCOSE: 114 MG/DL
GLUCOSE SERPL-MCNC: 97 MG/DL
HBA1C MFR BLD HPLC: 5.6 %
HCT VFR BLD CALC: 46.2 %
HDLC SERPL-MCNC: 48 MG/DL
HGB BLD-MCNC: 15.4 G/DL
IMM GRANULOCYTES NFR BLD AUTO: 0.3 %
LDLC SERPL CALC-MCNC: 111 MG/DL
LYMPHOCYTES # BLD AUTO: 1.6 K/UL
LYMPHOCYTES NFR BLD AUTO: 26.9 %
MAN DIFF?: NORMAL
MCHC RBC-ENTMCNC: 29.7 PG
MCHC RBC-ENTMCNC: 33.3 G/DL
MCV RBC AUTO: 89.2 FL
MONOCYTES # BLD AUTO: 0.53 K/UL
MONOCYTES NFR BLD AUTO: 8.9 %
NEUTROPHILS # BLD AUTO: 3.35 K/UL
NEUTROPHILS NFR BLD AUTO: 56.5 %
NONHDLC SERPL-MCNC: 151 MG/DL
PLATELET # BLD AUTO: 190 K/UL
POTASSIUM SERPL-SCNC: 4.2 MMOL/L
PROT SERPL-MCNC: 7.3 G/DL
RBC # BLD: 5.18 M/UL
RBC # FLD: 13.1 %
SODIUM SERPL-SCNC: 140 MMOL/L
TRIGL SERPL-MCNC: 232 MG/DL
TSH SERPL-ACNC: 1.39 UIU/ML
WBC # FLD AUTO: 5.94 K/UL

## 2025-02-13 ENCOUNTER — TRANSCRIPTION ENCOUNTER (OUTPATIENT)
Age: 40
End: 2025-02-13

## 2025-02-17 ENCOUNTER — APPOINTMENT (OUTPATIENT)
Dept: FAMILY MEDICINE | Facility: CLINIC | Age: 40
End: 2025-02-17
Payer: OTHER GOVERNMENT

## 2025-02-17 VITALS
HEIGHT: 75 IN | TEMPERATURE: 97.6 F | WEIGHT: 242 LBS | BODY MASS INDEX: 30.09 KG/M2 | HEART RATE: 75 BPM | DIASTOLIC BLOOD PRESSURE: 70 MMHG | OXYGEN SATURATION: 98 % | SYSTOLIC BLOOD PRESSURE: 112 MMHG

## 2025-02-17 DIAGNOSIS — M62.830 MUSCLE SPASM OF BACK: ICD-10-CM

## 2025-02-17 DIAGNOSIS — R06.83 SNORING: ICD-10-CM

## 2025-02-17 PROCEDURE — 99214 OFFICE O/P EST MOD 30 MIN: CPT

## 2025-02-17 RX ORDER — MELOXICAM 15 MG/1
15 TABLET ORAL
Qty: 10 | Refills: 0 | Status: ACTIVE | COMMUNITY
Start: 2025-02-17 | End: 1900-01-01

## 2025-02-17 RX ORDER — TIZANIDINE 4 MG/1
4 TABLET ORAL 3 TIMES DAILY
Qty: 21 | Refills: 1 | Status: ACTIVE | COMMUNITY
Start: 2025-02-17 | End: 1900-01-01

## 2025-02-25 ENCOUNTER — TRANSCRIPTION ENCOUNTER (OUTPATIENT)
Age: 40
End: 2025-02-25

## 2025-02-26 ENCOUNTER — TRANSCRIPTION ENCOUNTER (OUTPATIENT)
Age: 40
End: 2025-02-26

## 2025-05-02 ENCOUNTER — NON-APPOINTMENT (OUTPATIENT)
Age: 40
End: 2025-05-02

## 2025-06-24 ENCOUNTER — TRANSCRIPTION ENCOUNTER (OUTPATIENT)
Age: 40
End: 2025-06-24

## 2025-06-26 ENCOUNTER — APPOINTMENT (OUTPATIENT)
Dept: FAMILY MEDICINE | Facility: CLINIC | Age: 40
End: 2025-06-26
Payer: OTHER GOVERNMENT

## 2025-06-26 VITALS
HEART RATE: 77 BPM | HEIGHT: 75 IN | OXYGEN SATURATION: 97 % | SYSTOLIC BLOOD PRESSURE: 112 MMHG | WEIGHT: 248.44 LBS | DIASTOLIC BLOOD PRESSURE: 76 MMHG | TEMPERATURE: 98 F | BODY MASS INDEX: 30.89 KG/M2

## 2025-06-26 PROCEDURE — 99213 OFFICE O/P EST LOW 20 MIN: CPT

## 2025-06-26 RX ORDER — BENZONATATE 200 MG/1
200 CAPSULE ORAL 3 TIMES DAILY
Qty: 30 | Refills: 0 | Status: ACTIVE | COMMUNITY
Start: 2025-06-26 | End: 1900-01-01

## 2025-06-26 RX ORDER — AZITHROMYCIN 250 MG/1
250 TABLET, FILM COATED ORAL
Qty: 1 | Refills: 0 | Status: ACTIVE | COMMUNITY
Start: 2025-06-26 | End: 1900-01-01

## 2025-07-14 ENCOUNTER — APPOINTMENT (OUTPATIENT)
Dept: FAMILY MEDICINE | Facility: CLINIC | Age: 40
End: 2025-07-14
Payer: OTHER GOVERNMENT

## 2025-07-14 VITALS
WEIGHT: 248 LBS | BODY MASS INDEX: 30.84 KG/M2 | SYSTOLIC BLOOD PRESSURE: 130 MMHG | HEIGHT: 75 IN | DIASTOLIC BLOOD PRESSURE: 78 MMHG | TEMPERATURE: 97.6 F | OXYGEN SATURATION: 95 % | HEART RATE: 86 BPM

## 2025-07-14 PROBLEM — J20.9 ACUTE BRONCHITIS: Status: ACTIVE | Noted: 2025-06-26

## 2025-07-14 PROBLEM — R09.81 NASAL CONGESTION: Status: ACTIVE | Noted: 2025-07-14

## 2025-07-14 PROBLEM — Z12.83 SKIN EXAM FOR MALIGNANT NEOPLASM: Status: ACTIVE | Noted: 2025-07-14

## 2025-07-14 PROCEDURE — 99213 OFFICE O/P EST LOW 20 MIN: CPT

## 2025-07-24 ENCOUNTER — APPOINTMENT (OUTPATIENT)
Dept: OTOLARYNGOLOGY | Facility: CLINIC | Age: 40
End: 2025-07-24
Payer: OTHER GOVERNMENT

## 2025-07-24 ENCOUNTER — TRANSCRIPTION ENCOUNTER (OUTPATIENT)
Age: 40
End: 2025-07-24

## 2025-07-24 VITALS — WEIGHT: 248 LBS | HEIGHT: 75 IN | BODY MASS INDEX: 30.84 KG/M2

## 2025-07-24 DIAGNOSIS — J34.2 DEVIATED NASAL SEPTUM: ICD-10-CM

## 2025-07-24 DIAGNOSIS — J35.1 HYPERTROPHY OF TONSILS: ICD-10-CM

## 2025-07-24 DIAGNOSIS — K13.79 OTHER LESIONS OF ORAL MUCOSA: ICD-10-CM

## 2025-07-24 DIAGNOSIS — R06.83 SNORING: ICD-10-CM

## 2025-07-24 DIAGNOSIS — G47.33 OBSTRUCTIVE SLEEP APNEA (ADULT) (PEDIATRIC): ICD-10-CM

## 2025-07-24 PROCEDURE — 31231 NASAL ENDOSCOPY DX: CPT

## 2025-07-24 PROCEDURE — 99204 OFFICE O/P NEW MOD 45 MIN: CPT | Mod: 25

## 2025-07-24 RX ORDER — FLUTICASONE PROPIONATE 50 UG/1
50 SPRAY NASAL
Qty: 16 | Refills: 5 | Status: ACTIVE | COMMUNITY
Start: 2025-07-24 | End: 1900-01-01

## 2025-07-24 RX ORDER — CETIRIZINE HYDROCHLORIDE 10 MG/1
10 TABLET, FILM COATED ORAL
Refills: 0 | Status: ACTIVE | COMMUNITY

## 2025-07-30 ENCOUNTER — TRANSCRIPTION ENCOUNTER (OUTPATIENT)
Age: 40
End: 2025-07-30

## 2025-08-05 ENCOUNTER — TRANSCRIPTION ENCOUNTER (OUTPATIENT)
Age: 40
End: 2025-08-05

## 2025-08-07 ENCOUNTER — APPOINTMENT (OUTPATIENT)
Dept: DERMATOLOGY | Facility: CLINIC | Age: 40
End: 2025-08-07

## 2025-08-07 DIAGNOSIS — D18.01 HEMANGIOMA OF SKIN AND SUBCUTANEOUS TISSUE: ICD-10-CM

## 2025-08-07 DIAGNOSIS — L81.4 OTHER MELANIN HYPERPIGMENTATION: ICD-10-CM

## 2025-08-07 DIAGNOSIS — D22.5 MELANOCYTIC NEVI OF TRUNK: ICD-10-CM

## 2025-08-07 PROCEDURE — 99204 OFFICE O/P NEW MOD 45 MIN: CPT

## 2025-08-12 ENCOUNTER — APPOINTMENT (OUTPATIENT)
Dept: DERMATOLOGY | Facility: CLINIC | Age: 40
End: 2025-08-12